# Patient Record
Sex: FEMALE | Race: WHITE | NOT HISPANIC OR LATINO | Employment: OTHER | URBAN - METROPOLITAN AREA
[De-identification: names, ages, dates, MRNs, and addresses within clinical notes are randomized per-mention and may not be internally consistent; named-entity substitution may affect disease eponyms.]

---

## 2019-03-02 ENCOUNTER — HOSPITAL ENCOUNTER (EMERGENCY)
Facility: HOSPITAL | Age: 84
Discharge: HOME | End: 2019-03-02
Attending: EMERGENCY MEDICINE
Payer: MEDICARE

## 2019-03-02 VITALS
RESPIRATION RATE: 18 BRPM | HEART RATE: 65 BPM | SYSTOLIC BLOOD PRESSURE: 145 MMHG | OXYGEN SATURATION: 97 % | WEIGHT: 115 LBS | HEIGHT: 60 IN | BODY MASS INDEX: 22.58 KG/M2 | TEMPERATURE: 98 F | DIASTOLIC BLOOD PRESSURE: 64 MMHG

## 2019-03-02 DIAGNOSIS — R07.81 RIB PAIN ON LEFT SIDE: ICD-10-CM

## 2019-03-02 DIAGNOSIS — R91.1 PULMONARY NODULE: Primary | ICD-10-CM

## 2019-03-02 DIAGNOSIS — K76.9 LESION OF LIVER: ICD-10-CM

## 2019-03-02 DIAGNOSIS — K86.9 PANCREATIC LESION: ICD-10-CM

## 2019-03-02 DIAGNOSIS — N20.0 LEFT NEPHROLITHIASIS: ICD-10-CM

## 2019-03-02 LAB
ALBUMIN SERPL BCP-MCNC: 3.1 G/DL
ALP SERPL-CCNC: 94 U/L
ALT SERPL W/O P-5'-P-CCNC: 8 U/L
ANION GAP SERPL CALC-SCNC: 11 MMOL/L
AST SERPL-CCNC: 12 U/L
BASOPHILS # BLD AUTO: 0.02 K/UL
BASOPHILS NFR BLD: 0.3 %
BILIRUB SERPL-MCNC: 0.9 MG/DL
BILIRUB UR QL STRIP: NEGATIVE
BNP SERPL-MCNC: 231 PG/ML
BUN SERPL-MCNC: 18 MG/DL
CALCIUM SERPL-MCNC: 9.4 MG/DL
CHLORIDE SERPL-SCNC: 103 MMOL/L
CLARITY UR: CLEAR
CO2 SERPL-SCNC: 29 MMOL/L
COLOR UR: YELLOW
CREAT SERPL-MCNC: 0.9 MG/DL
DIFFERENTIAL METHOD: ABNORMAL
EOSINOPHIL # BLD AUTO: 0.1 K/UL
EOSINOPHIL NFR BLD: 0.9 %
ERYTHROCYTE [DISTWIDTH] IN BLOOD BY AUTOMATED COUNT: 14.6 %
EST. GFR  (AFRICAN AMERICAN): >60 ML/MIN/1.73 M^2
EST. GFR  (NON AFRICAN AMERICAN): 56 ML/MIN/1.73 M^2
GLUCOSE SERPL-MCNC: 125 MG/DL
GLUCOSE UR QL STRIP: NEGATIVE
HCT VFR BLD AUTO: 40.7 %
HGB BLD-MCNC: 12.5 G/DL
HGB UR QL STRIP: ABNORMAL
INR PPP: 2
KETONES UR QL STRIP: NEGATIVE
LEUKOCYTE ESTERASE UR QL STRIP: NEGATIVE
LIPASE SERPL-CCNC: 4 U/L
LYMPHOCYTES # BLD AUTO: 1 K/UL
LYMPHOCYTES NFR BLD: 13 %
MCH RBC QN AUTO: 26.4 PG
MCHC RBC AUTO-ENTMCNC: 30.7 G/DL
MCV RBC AUTO: 86 FL
MICROSCOPIC COMMENT: ABNORMAL
MONOCYTES # BLD AUTO: 0.7 K/UL
MONOCYTES NFR BLD: 9 %
NEUTROPHILS # BLD AUTO: 6.1 K/UL
NEUTROPHILS NFR BLD: 76.8 %
NITRITE UR QL STRIP: NEGATIVE
PH UR STRIP: 6 [PH] (ref 5–8)
PLATELET # BLD AUTO: 293 K/UL
PMV BLD AUTO: 8.7 FL
POTASSIUM SERPL-SCNC: 4.2 MMOL/L
PROT SERPL-MCNC: 6.6 G/DL
PROT UR QL STRIP: NEGATIVE
PROTHROMBIN TIME: 20.8 SEC
RBC # BLD AUTO: 4.74 M/UL
RBC #/AREA URNS HPF: 8 /HPF (ref 0–4)
SODIUM SERPL-SCNC: 143 MMOL/L
SP GR UR STRIP: 1.01 (ref 1–1.03)
SQUAMOUS #/AREA URNS HPF: 2 /HPF
TROPONIN I SERPL DL<=0.01 NG/ML-MCNC: 0.01 NG/ML
URN SPEC COLLECT METH UR: ABNORMAL
UROBILINOGEN UR STRIP-ACNC: ABNORMAL EU/DL
WBC # BLD AUTO: 7.97 K/UL

## 2019-03-02 PROCEDURE — 83880 ASSAY OF NATRIURETIC PEPTIDE: CPT

## 2019-03-02 PROCEDURE — 84484 ASSAY OF TROPONIN QUANT: CPT

## 2019-03-02 PROCEDURE — 85610 PROTHROMBIN TIME: CPT

## 2019-03-02 PROCEDURE — 25500020 PHARM REV CODE 255: Performed by: EMERGENCY MEDICINE

## 2019-03-02 PROCEDURE — 80053 COMPREHEN METABOLIC PANEL: CPT

## 2019-03-02 PROCEDURE — 85025 COMPLETE CBC W/AUTO DIFF WBC: CPT

## 2019-03-02 PROCEDURE — 81000 URINALYSIS NONAUTO W/SCOPE: CPT

## 2019-03-02 PROCEDURE — 99285 EMERGENCY DEPT VISIT HI MDM: CPT

## 2019-03-02 PROCEDURE — 83690 ASSAY OF LIPASE: CPT

## 2019-03-02 RX ORDER — FUROSEMIDE 20 MG/1
20 TABLET ORAL 2 TIMES DAILY
COMMUNITY

## 2019-03-02 RX ORDER — METOPROLOL SUCCINATE 25 MG/1
25 TABLET, EXTENDED RELEASE ORAL DAILY
COMMUNITY

## 2019-03-02 RX ORDER — POTASSIUM CHLORIDE 750 MG/1
10 TABLET, EXTENDED RELEASE ORAL ONCE
COMMUNITY

## 2019-03-02 RX ORDER — DONEPEZIL HYDROCHLORIDE 10 MG/1
10 TABLET, FILM COATED ORAL DAILY
COMMUNITY

## 2019-03-02 RX ORDER — MULTIVIT WITH MINERALS/HERBS
1 TABLET ORAL DAILY
COMMUNITY

## 2019-03-02 RX ORDER — DEXTROMETHORPHAN HYDROBROMIDE, GUAIFENESIN 5; 100 MG/5ML; MG/5ML
650 LIQUID ORAL EVERY 8 HOURS PRN
Qty: 30 TABLET | Refills: 0 | Status: SHIPPED | OUTPATIENT
Start: 2019-03-02

## 2019-03-02 RX ORDER — LIDOCAINE 50 MG/G
1 PATCH TOPICAL DAILY
Qty: 15 PATCH | Refills: 0 | Status: SHIPPED | OUTPATIENT
Start: 2019-03-02

## 2019-03-02 RX ORDER — LOSARTAN POTASSIUM 50 MG/1
50 TABLET ORAL DAILY
COMMUNITY

## 2019-03-02 RX ORDER — WARFARIN SODIUM 5 MG/1
5 TABLET ORAL DAILY
COMMUNITY

## 2019-03-02 RX ORDER — AMLODIPINE BESYLATE 5 MG/1
5 TABLET ORAL DAILY
COMMUNITY

## 2019-03-02 RX ORDER — ATORVASTATIN CALCIUM 20 MG/1
20 TABLET, FILM COATED ORAL DAILY
COMMUNITY

## 2019-03-02 RX ADMIN — IOHEXOL 75 ML: 350 INJECTION, SOLUTION INTRAVENOUS at 01:03

## 2019-03-02 NOTE — ED TRIAGE NOTES
"Pt here with caregiver for left sided abd pain "right under my rib". Pt reports pain has resolved at this time. Caregiver states pt has been x3 days. Pt hx of dementia. States "the pain was interfering with her walking, she usually walks with a walker". Pt stood up from wheelchair with steady gait, transferred to stretcher with minimal assistance.   "

## 2019-03-02 NOTE — ED PROVIDER NOTES
Encounter Date: 3/2/2019    This is a SORT/MSE of a 89 y.o. female presenting to the ED with c/o left sided abdominal/flank pain. Patient reports pain resolved. Sitter reports patient has dementia and pain is still there and affecting her walking. Care will be transferred to an alternate provider when patient is roomed for a full evaluation and final disposition. REMINGTONVIN Hou, FNP-C 3/2/2019 10:32 AM    SCRIBE #1 NOTE: I, Amira Echeverria, am scribing for, and in the presence of,  Phu Mathews MD. I have scribed the following portions of the note - Other sections scribed: ROS and HPI.       History     Chief Complaint   Patient presents with    Abdominal Pain     left upper abd pain/rib/flank area pains making it difficult to ambulate.   denies n/v/d or fever.   pt states pain is gone.  sitter states pain still there and pt has dementia.     CC: Flank Pain    HPI: This 89 y.o. female presents to the ED complaining of L sided flank pain/L lateral rib pain for last 2 days worse today. Pain is severe and constant, worse with movement.  Patient usually ambulates with a walker but unable to do so today due to her pain. Denies CP, SOB, abdominal pain, N/V/D, numbness, weakness or any urinary sx. No reported medical intervention for her current sx. She takes Coumadin.      The history is provided by the patient and a relative. No  was used.     Review of patient's allergies indicates:  Allergies not on file  History reviewed. No pertinent past medical history.  Past Surgical History:   Procedure Laterality Date    BREAST SURGERY       History reviewed. No pertinent family history.  Social History     Tobacco Use    Smoking status: Former Smoker    Smokeless tobacco: Never Used   Substance Use Topics    Alcohol use: No     Frequency: Never    Drug use: No     Review of Systems   Constitutional: Negative for chills and fever.   HENT: Negative for congestion, ear pain, rhinorrhea and sore throat.     Eyes: Negative for pain and visual disturbance.   Respiratory: Negative for cough and shortness of breath.    Cardiovascular: Negative for chest pain.   Gastrointestinal: Negative for abdominal pain, diarrhea, nausea and vomiting.   Genitourinary: Positive for flank pain (L sided). Negative for dysuria.   Musculoskeletal: Negative for back pain and neck pain.        (+) L lateral rib pain   Skin: Negative for rash.   Neurological: Negative for syncope, weakness, numbness and headaches.       Physical Exam     Initial Vitals [03/02/19 1029]   BP Pulse Resp Temp SpO2   (!) 136/58 73 20 97.6 °F (36.4 °C) 95 %      MAP       --         Physical Exam    Nursing note and vitals reviewed.  Constitutional: She appears well-developed and well-nourished. She is not diaphoretic. No distress.   HENT:   Head: Normocephalic and atraumatic.   Right Ear: External ear normal.   Left Ear: External ear normal.   Nose: Nose normal.   Mouth/Throat: Oropharynx is clear and moist.   Eyes: Conjunctivae and EOM are normal. Pupils are equal, round, and reactive to light. Right eye exhibits no discharge. Left eye exhibits no discharge. No scleral icterus.   Neck: Normal range of motion. Neck supple.   Cardiovascular: Normal rate, regular rhythm, normal heart sounds and intact distal pulses.   No murmur heard.  Pulmonary/Chest: Breath sounds normal. No respiratory distress. She has no wheezes. She has no rhonchi. She has no rales.   Abdominal: Soft. Bowel sounds are normal. She exhibits no distension and no mass. There is no tenderness. There is no rebound and no guarding.   Abdomen is mildly distended.  However, is soft.  There are normal bowel sounds.  There is no focal tenderness on examination of any of the 4 quadrants.  No hernias or masses. There is no flank tenderness.  The patient indicates lateral inferior rib areas area of tenderness but there is tenderness or crepitus to palpation.  No shortness of breath.   Musculoskeletal:  Normal range of motion. She exhibits no edema or tenderness.   Neurological: She is alert and oriented to person, place, and time. She has normal strength. No cranial nerve deficit or sensory deficit.   Skin: Skin is warm and dry. No rash noted. No erythema. No pallor.   Psychiatric: She has a normal mood and affect. Her behavior is normal. Judgment and thought content normal.         ED Course   Procedures  Labs Reviewed   CBC W/ AUTO DIFFERENTIAL - Abnormal; Notable for the following components:       Result Value    MCH 26.4 (*)     MCHC 30.7 (*)     RDW 14.6 (*)     MPV 8.7 (*)     Gran% 76.8 (*)     Lymph% 13.0 (*)     All other components within normal limits   COMPREHENSIVE METABOLIC PANEL - Abnormal; Notable for the following components:    Glucose 125 (*)     Albumin 3.1 (*)     ALT 8 (*)     eGFR if non  56 (*)     All other components within normal limits   URINALYSIS, REFLEX TO URINE CULTURE - Abnormal; Notable for the following components:    Occult Blood UA 1+ (*)     Urobilinogen, UA 2.0-3.0 (*)     All other components within normal limits    Narrative:     Preferred Collection Type->Urine, Clean Catch   B-TYPE NATRIURETIC PEPTIDE - Abnormal; Notable for the following components:     (*)     All other components within normal limits   PROTIME-INR - Abnormal; Notable for the following components:    Prothrombin Time 20.8 (*)     INR 2.0 (*)     All other components within normal limits   URINALYSIS MICROSCOPIC - Abnormal; Notable for the following components:    RBC, UA 8 (*)     All other components within normal limits    Narrative:     Preferred Collection Type->Urine, Clean Catch   LIPASE   TROPONIN I     EKG Readings: (Independently Interpreted)   Initial Reading: No STEMI. Ectopy: No Ectopy.   EKG reviewed and interpreted by me shows atrial flutter with variable AV block at a rate of 70.  The QRS and QT intervals are within normal limits.  There is left axis deviation.   There is normal R-wave progression.  There are no ST segment or T-wave ischemic findings.         Imaging Results          CT Abdomen Pelvis With Contrast (Final result)     Abnormal  Result time 03/02/19 13:32:49    Final result by Uri Mulligan MD (03/02/19 13:32:49)                 Impression:      1. Left renal 7-8 mm nonobstructing nephrolith which could contribute to patient's stated history of left-sided abdominal pain.  2. Suspected hepatic steatosis.  Correlate with LFTs.  3. Two hepatic ill-defined foci of enhancement.  Differential considerations could include atypical hemangiomas with metastatic foci not excluded.  Further evaluation/follow-up as warranted.  4. Pneumobilia and gas within the gallbladder without associated inflammatory change.  This is nonspecific and could be related to previous instrumentation such as ERCP or other procedure, noting gas-forming infection cannot be excluded in the proper clinical setting.  5. Marked pancreatic atrophy with at least 2 small hypodense parenchymal foci.  Differential considerations could include pseudocyst, pancreatic cyst or cystic neoplasm including IPMN.  6. Cardiomegaly with aortic annular calcifications, coronary atherosclerosis and moderate to advanced systemic atherosclerosis.  7. Diverticulosis coli without diverticulitis.  8. Additional findings as above.  This report was flagged in Epic as abnormal.      Electronically signed by: Uri Mulligan MD  Date:    03/02/2019  Time:    13:32             Narrative:    EXAMINATION:  CT ABDOMEN PELVIS WITH CONTRAST    CLINICAL HISTORY:  Left-sided abdominal pain;    TECHNIQUE:  Low dose axial images, sagittal and coronal reformations were obtained from the lung bases to the pubic symphysis following the IV administration of 75 mL of Omnipaque 350 .  Oral contrast was not given.    COMPARISON:  Chest radiograph same day    FINDINGS:  Imaged lung bases show few scattered linear opacities consistent with  subsegmental scarring versus atelectasis and minimal dependent atelectasis.  Base of the heart is enlarged without significant pericardial fluid noting coronary arterial and aortic annular calcifications.  Scattered linear appearing calcifications at both breasts which may be vascular but remain overall indeterminate.    Liver is normal in size.  There is heterogeneous diffuse hypoattenuation of the liver with some geographic peripheral sparing suggesting fatty infiltration.  There are at least 2 somewhat ill-defined foci of enhancement measuring up to 1.5 cm within the anterior aspect of the mid right hepatic lobe and 1.4 cm within the anterior aspect of the lateral left hepatic lobe.  There is both intrahepatic and also extrahepatic pneumobilia as well as nondependent gas seen within the gallbladder.  Gallbladder is otherwise well distended without CT evidence of acute cholecystitis or radiodense gallstones.  No biliary ductal dilatation.    Pancreas is markedly atrophic.  There are at least 2 hypodense pancreatic foci noted at the neck region measuring 8 mm and body measuring 13 mm no associated pancreatic ductal dilatation or adjacent inflammation.    The spleen and stomach are within normal limits.  There is nonspecific nodular thickening of the bilateral adrenal glands.    The bilateral kidneys are mildly atrophic with mild diffuse cortical thinning, more so on the right, but otherwise demonstrate relatively symmetric enhancement.  There is also nonspecific cortical scarring at the right renal lower pole.  There is a 7-8 mm calculus at the left renal lower pole.  No hydronephrosis or significant perinephric stranding.  Several scattered subcentimeter hypoattenuating cortical foci at both kidneys which are too small to characterize.  Bilateral ureters are within normal limits.  Urinary bladder is within normal limits.  Hysterectomy.  A few scattered pelvic phleboliths noted.  No adnexal mass seen.    No  ascites, free air or lymphadenopathy.  Moderate to advanced scattered atherosclerosis.  Abdominal aorta is atherosclerotic and mildly ectatic without aneurysm or dissection.    There are small fat containing bilateral inguinal hernias, left greater than right.  Appendix is not identified; however, no pericecal inflammatory change.  Terminal ileum is within normal limits.  Mild amount of scattered colonic fecal material.  Several scattered diverticula of the descending and sigmoid colon without focal diverticulitis.  No evidence of bowel obstruction or inflammation.  No pneumatosis or portal venous gas.    Osseous structures show generalized osteopenia, chronic appearing moderate anterior wedge deformity of T11 and degenerative related grade 1 anterolisthesis of L4 on 5 with age-related degenerative changes most prominent at the lower lumbosacral spine.  No acute or destructive osseous process seen.                               X-Ray Chest AP Portable (Final result)     Abnormal  Result time 03/02/19 11:45:07    Final result by Sofie Caballero MD (03/02/19 11:45:07)                 Impression:      Right lower lobe 9 mm pulmonary nodule.  Recommend correlation with prior exams to establish stability.  If not available, a nonemergent chest CT may be obtained for further characterization.    This report was flagged in Epic as abnormal.      Electronically signed by: Sofie Caballero  Date:    03/02/2019  Time:    11:45             Narrative:    EXAMINATION:  XR CHEST AP PORTABLE    CLINICAL HISTORY:  Pleurodynia    TECHNIQUE:  Single frontal view of the chest was performed.    COMPARISON:  None    FINDINGS:  Normal cardiomediastinal contour.  Low lung volumes.  9 mm right lower lobe pulmonary nodule.  No pleural effusion, focal consolidation or pneumothorax.                              X-Rays:   Independently Interpreted Readings:   Other Readings:  Chest x-ray reveals no infiltrate or consolidation.  There is right-sided  pulmonary nodule.    Medical Decision Making:   ED Management:  This is the emergent evaluation of a 89-year-old female presents emergency department complaining of left-sided flank pain for the past 2 days worsening today.  Differential diagnosis at the time of initial evaluation included, but was not limited to:  Urinary tract infection, traumatic injury, who lower lobe pneumonia.       It is unclear what is causing the patient's symptoms.  This may be musculoskeletal in nature.  The patient has multiple findings on chest x-ray and CT scan which all appear to be incidental and related to her pain. Specifically, she does have a left-sided renal stone but no evidence of ureteral stone or hydronephrosis.  I doubt this is causing her symptoms. Her symptoms have totally resolved at this time.  She does have findings of possible lesions to the liver and pancreas without underlying laboratory abnormalities.  Unclear of these are significant for malignancy.  She needs further workup for this pending follow-up with her PCP as an outpatient.  I explained this to the patient and her caregiver and to her daughter on the telephone.  She has a pulmonary nodule in the right which also needs further workup.  Again, all the above was explained.  Patient advised to return for any new or worsening symptoms.  Symptomatic treatment with medications for symptoms.            Scribe Attestation:   Scribe #1: I performed the above scribed service and the documentation accurately describes the services I performed. I attest to the accuracy of the note.    Attending Attestation:           Physician Attestation for Scribe:  Physician Attestation Statement for Scribe #1: I, Phu Mathews MD, reviewed documentation, as scribed by Amira Echeverria in my presence, and it is both accurate and complete.                    Clinical Impression:       ICD-10-CM ICD-9-CM   1. Pulmonary nodule R91.1 793.11   2. Rib pain on left side R07.81 786.50   3.  Left nephrolithiasis N20.0 592.0   4. Lesion of liver K76.9 573.8   5. Pancreatic lesion K86.9 577.9                                Phu Mtahews Jr., MD  03/02/19 [id number]

## 2019-03-02 NOTE — DISCHARGE INSTRUCTIONS
There were multiple findings on your CT scan and chest x-ray today that need follow-up.  However, none of these are believed to be causing her symptoms which have now resolved.  If your symptoms get worse or if new symptoms develop such as chest pain, fever, or shortness of breath, please return to the emergency department.  Please use medications as prescribed.  Please follow up with your PCP in the next 1 week for determination of how to continue working up and evaluating CT and chest x-ray findings (print out given).